# Patient Record
Sex: FEMALE | Race: WHITE | NOT HISPANIC OR LATINO | Employment: UNEMPLOYED | URBAN - METROPOLITAN AREA
[De-identification: names, ages, dates, MRNs, and addresses within clinical notes are randomized per-mention and may not be internally consistent; named-entity substitution may affect disease eponyms.]

---

## 2017-11-17 RX ORDER — ALPRAZOLAM 0.5 MG/1
0.5 TABLET ORAL 2 TIMES DAILY PRN
COMMUNITY

## 2017-11-17 RX ORDER — GABAPENTIN 400 MG/1
400 CAPSULE ORAL 3 TIMES DAILY
COMMUNITY

## 2017-11-20 ENCOUNTER — OFFICE VISIT (OUTPATIENT)
Dept: NEUROSURGERY | Facility: CLINIC | Age: 57
End: 2017-11-20

## 2017-11-20 VITALS — HEIGHT: 68 IN | TEMPERATURE: 99.3 F | WEIGHT: 116 LBS | BODY MASS INDEX: 17.58 KG/M2

## 2017-11-20 DIAGNOSIS — M47.812 CERVICAL ARTHRITIS: ICD-10-CM

## 2017-11-20 DIAGNOSIS — M47.812 CERVICAL SPONDYLOSIS WITHOUT MYELOPATHY: Primary | ICD-10-CM

## 2017-11-20 DIAGNOSIS — M50.30 DEGENERATIVE DISC DISEASE, CERVICAL: ICD-10-CM

## 2017-11-20 PROCEDURE — 99243 OFF/OP CNSLTJ NEW/EST LOW 30: CPT | Performed by: NEUROLOGICAL SURGERY

## 2017-11-20 RX ORDER — TRAZODONE HYDROCHLORIDE 100 MG/1
100 TABLET ORAL DAILY
COMMUNITY
Start: 2017-11-03

## 2017-11-20 RX ORDER — TRAMADOL HYDROCHLORIDE 50 MG/1
50 TABLET ORAL 3 TIMES DAILY PRN
COMMUNITY
Start: 2017-11-03

## 2017-11-20 RX ORDER — VENLAFAXINE HYDROCHLORIDE 75 MG/1
75 CAPSULE, EXTENDED RELEASE ORAL DAILY
COMMUNITY
Start: 2017-11-03

## 2017-11-20 RX ORDER — OMEPRAZOLE 40 MG/1
40 CAPSULE, DELAYED RELEASE ORAL DAILY
COMMUNITY
Start: 2017-11-03

## 2017-11-20 RX ORDER — ONDANSETRON 4 MG/1
4 TABLET, FILM COATED ORAL DAILY PRN
COMMUNITY
Start: 2017-11-01

## 2017-11-20 NOTE — PROGRESS NOTES
Patient: Martita Meyer  : 1960    Primary Care Provider: Rolf Manley MD    Requesting Provider: As above        History    Chief Complaint: Neck pain.    History of Present Illness: Ms. Meyer is a 57-year-old unemployed woman with a several year history of neck pain that will sometimes extend into the shoulders.  She has associated headache on the back of her head.  The pain will involve her shoulders, right greater than left.  She has no radicular arm symptoms.  She denies numbness or weakness.  She is worse with movement of her neck.  Sometimes at home she wears a neck brace which can be helpful.  Given some stomach issues she is unable to take NSAIDs.  She has not undergone any formal treatment.    Review of Systems   Constitutional: Positive for activity change. Negative for appetite change, chills, diaphoresis, fatigue, fever and unexpected weight change.   HENT: Negative for congestion, dental problem, drooling, ear discharge, ear pain, facial swelling, hearing loss, mouth sores, nosebleeds, postnasal drip, rhinorrhea, sinus pressure, sneezing, sore throat, tinnitus, trouble swallowing and voice change.    Eyes: Negative for photophobia, pain, discharge, redness, itching and visual disturbance.   Respiratory: Negative for apnea, cough, choking, chest tightness, shortness of breath, wheezing and stridor.    Cardiovascular: Negative for chest pain, palpitations and leg swelling.   Gastrointestinal: Negative for abdominal distention, abdominal pain, anal bleeding, blood in stool, constipation, diarrhea, nausea, rectal pain and vomiting.   Endocrine: Negative for cold intolerance, heat intolerance, polydipsia, polyphagia and polyuria.   Genitourinary: Negative for decreased urine volume, difficulty urinating, dysuria, enuresis, flank pain, frequency, genital sores, hematuria and urgency.   Musculoskeletal: Positive for arthralgias, back pain, joint swelling, neck pain and neck stiffness. Negative  "for gait problem and myalgias.   Skin: Negative for color change, pallor, rash and wound.   Allergic/Immunologic: Negative for environmental allergies, food allergies and immunocompromised state.   Neurological: Positive for weakness and headaches. Negative for dizziness, tremors, seizures, syncope, facial asymmetry, speech difficulty, light-headedness and numbness.   Hematological: Negative for adenopathy. Does not bruise/bleed easily.   Psychiatric/Behavioral: Negative for agitation, behavioral problems, confusion, decreased concentration, dysphoric mood, hallucinations, self-injury, sleep disturbance and suicidal ideas. The patient is not nervous/anxious and is not hyperactive.        The patient's past medical history, past surgical history, family history, and social history have been reviewed at length in the electronic medical record.    Physical Exam:   Temp 99.3 °F (37.4 °C)  Ht 68\" (172.7 cm)  Wt 116 lb (52.6 kg)  BMI 17.64 kg/m2  CONSTITUTIONAL: Patient is well-nourished, pleasant and appears stated age.  CV: Heart regular rate and rhythm without murmur, rub, or gallop.  PULMONARY: Lungs are clear to ascultation.  MUSCULOSKELETAL:  Neck tenderness to palpation is not observed.   ROM in neck is somewhat limited in all directions.  Ranging her shoulders is fairly well-tolerated without significant pain.  NEUROLOGICAL:  Orientation, memory, attention span, language function, and cognition have been examined and are intact.  Strength is intact in the upper and lower extremities to direct testing.  Muscle tone is normal throughout.  Station and gait are normal.  Sensation is intact to light touch testing throughout.  Deep tendon reflexes are 1+ and symmetrical.  Shereen's Sign is negative bilaterally. No clonus is elicited.  Coordination is intact.      Medical Decision Making    Data Review:   Plain films of her neck as well as an MRI of her cervical spine dated 10/31/17 are reviewed.  There is some " spondylosis most pronounced at C4-5 and C5-6.  There is some recess narrowing on the right at C4-5.  There is broad-based spurring with bilateral recess narrowing at C5-6.  There is no cord compromise.    Diagnosis:   Cervical spondylosis with axial neck pain.  I do not see evidence of a radiculopathy.    Treatment Options:   I'm going to have the patient undergo some physical therapy to try and diminish her symptoms.  I do not currently see a role for surgical intervention and treatment will otherwise need to be symptomatic.  I have discussed the merits of smoking cessation as it relates to the degenerative changes in her spine.  She will follow-up on an as-needed basis.       Diagnosis Plan   1. Cervical spondylosis without myelopathy  Ambulatory Referral to Physical Therapy Evaluate and treat   2. Cervical arthritis     3. Degenerative disc disease, cervical         Scribed for Nabor Linares MD by Clara Guerrero CMA on 11/20/2017 at 1:59 PM    I, Dr. Linares, personally performed the services described in the documentation, as scribed in my presence, and it is both accurate and complete.